# Patient Record
(demographics unavailable — no encounter records)

---

## 2024-11-15 NOTE — ASSESSMENT
[FreeTextEntry1] : This is a 59-year-old woman with exam and history consistent with essential tremor.  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  I will start her on low-dose propranolol and titrate to efficacy or to tolerance.  I did explain to her that propranolol is medicine used for blood pressure and that it may slow down her heart rate or make her lightheaded if her blood pressure drops and that is why restarting it at a low dose and will raise it slowly as needed.  Should propranolol not work we discussed primidone would be our next option.  I will see her back in 4 months but she will call me in 1 month to let me know how she is doing.

## 2024-11-15 NOTE — CONSULT LETTER
[Dear  ___] : Dear  [unfilled], [Consult Letter:] : I had the pleasure of evaluating your patient, [unfilled]. [Please see my note below.] : Please see my note below. [Consult Closing:] : Thank you very much for allowing me to participate in the care of this patient.  If you have any questions, please do not hesitate to contact me. [Sincerely,] : Sincerely, [FreeTextEntry3] : Carlos Eduardo Moran M.D., Ph.D. DPN-N [facility] [place] Director, Division of Neurology Director, [facility]

## 2024-11-15 NOTE — HISTORY OF PRESENT ILLNESS
[FreeTextEntry1] : Initial office visit November 15, 2024: This is a 59-year-old woman who presents today with tremor.  She has had it for several years.  It affects her left hand more than her right hand.  It is present with activity or holding an object.  Is not present at rest.  Her father had a similar tremor.  The tremor gets worse if she is overly excited or nervous.  She drinks significant amount of caffeine.  She is here today for neurologic  evaluation and treatment.

## 2024-11-15 NOTE — PHYSICAL EXAM
[General Appearance - Alert] : alert [General Appearance - In No Acute Distress] : in no acute distress [General Appearance - Well Nourished] : well nourished [General Appearance - Well Developed] : well developed [Person] : oriented to person [Place] : oriented to place [Time] : oriented to time [Remote Intact] : remote memory intact [Registration Intact] : recent registration memory intact [Span Intact] : the attention span was normal [Concentration Intact] : normal concentrating ability [Visual Intact] : visual attention was ~T not ~L decreased [Naming Objects] : no difficulty naming common objects [Repeating Phrases] : no difficulty repeating a phrase [Fluency] : fluency intact [Comprehension] : comprehension intact [Current Events] : adequate knowledge of current events [Past History] : adequate knowledge of personal past history [Cranial Nerves Optic (II)] : visual acuity intact bilaterally,  visual fields full to confrontation, pupils equal round and reactive to light [Cranial Nerves Oculomotor (III)] : extraocular motion intact [Cranial Nerves Trigeminal (V)] : facial sensation intact symmetrically [Cranial Nerves Facial (VII)] : face symmetrical [Cranial Nerves Vestibulocochlear (VIII)] : hearing was intact bilaterally [Cranial Nerves Glossopharyngeal (IX)] : tongue and palate midline [Cranial Nerves Accessory (XI - Cranial And Spinal)] : head turning and shoulder shrug symmetric [Cranial Nerves Hypoglossal (XII)] : there was no tongue deviation with protrusion [Motor Tone] : muscle tone was normal in all four extremities [Motor Strength] : muscle strength was normal in all four extremities [No Muscle Atrophy] : normal bulk in all four extremities [Paresis Pronator Drift Right-Sided] : no pronator drift on the right [Paresis Pronator Drift Left-Sided] : no pronator drift on the left [Motor Strength Upper Extremities Bilaterally] : strength was normal in both upper extremities [Motor Strength Lower Extremities Bilaterally] : strength was normal in both lower extremities [Sensation Tactile Decrease] : light touch was intact [Sensation Pain / Temperature Decrease] : pain and temperature was intact [Sensation Vibration Decrease] : vibration was intact [Proprioception] : proprioception was intact [Abnormal Walk] : normal gait [Balance] : balance was intact [Tremor] : a tremor present [Coordination - Dysmetria Impaired Finger-to-Nose Bilateral] : not present [2+] : Patella left 2+ [Sclera] : the sclera and conjunctiva were normal [PERRL With Normal Accommodation] : pupils were equal in size, round, reactive to light, with normal accommodation [Extraocular Movements] : extraocular movements were intact [No APD] : no afferent pupillary defect [No MICHELLE] : no internuclear ophthalmoplegia [Full Visual Field] : full visual field

## 2024-12-11 NOTE — END OF VISIT
[FreeTextEntry3] : I, Nicole Enriquez, acted solely as a scribe for Dr. Ba Khan on this date 12/11/2024.  I, Ba Khan MD, personally performed the services described in the documentation, reviewed the documentation recorded by the scribe in my presence and it accurately and completely records my words and actions.

## 2024-12-11 NOTE — DISCUSSION/SUMMARY
[Medication Risks Reviewed] : Medication risks reviewed [de-identified] : 59-year-old female with bilateral knee moderate medial patellofemoral compartmental osteoarthritis as well as a previously confirmed degenerative meniscus tear of the right knee via MRI back in 2016. The nature of condition and treatment options were discussed in detail. Pt is not yet a candidate for b/l TKA.  She did previously respond well to a cortisone injection given to the right knee back in 2015. However, she did not respond to the last cortisone injection. We discussed to proceed with viscosupplementation and physical therapy. I ordered the gel injections. I sent a script for physical therapy. Pt may continue to take Meloxicam and Tylenol as needed. She should continue to do low impact exercises. She may take Tylenol and NSAIDs as needed. Pt will f/u when gel arrives at office.

## 2024-12-11 NOTE — PHYSICAL EXAM
[de-identified] : GENERAL APPEARANCE: Well nourished and hydrated, pleasant, alert, and oriented x 3. Appears their stated age.  HEENT: Normocephalic, extraocular eye motion intact. Nasal septum midline. Oral cavity clear. External auditory canal clear.  RESPIRATORY: Breath sounds clear and audible in all lobes. No wheezing, No accessory muscle use. CARDIOVASCULAR: No apparent abnormalities. No lower leg edema. No varicosities. Pedal pulses are palpable. NEUROLOGIC: Sensation is normal, no muscle weakness in the upper or lower extremities. DERMATOLOGIC: No apparent skin lesions, moist, warm, no rash. SPINE: Cervical spine appears normal and moves freely; thoracic spine appears normal and moves freely; lumbosacral spine appears normal and moves freely, normal, nontender. MUSCULOSKELETAL: Hands, wrists, and elbows are normal and move freely, shoulders are normal and move freely.  PSYCHIATRIC: Oriented to person, place, and time, insight and judgement were intact and the affect was normal.  Right knee exam shows mild effusion, ROM is 0-120 degrees with pain at terminal flexion and positive patellofemoral crepitus, no gross instability, negative Frederic, medial joint line tenderness.  Left knee exam shows mild effusion, ROM is 0-120 degrees and positive patellofemoral crepitus, no gross instability, negative Frederic, medial joint line tenderness. 5/5 motor strength in bilateral lower extremities. Sensory: Intact in bilateral lower extremities. DTRs: Biceps, brachioradialis, triceps, patellar, ankle and plantar 2+ and symmetric bilaterally. Pulses: dorsalis pedis, posterior tibial, femoral, popliteal, and radial 2+ and symmetric bilaterally.

## 2024-12-11 NOTE — HISTORY OF PRESENT ILLNESS
[de-identified] : Patient is a 59-year-old female that does present today for follow up of bilateral knee pain.  She was first seen in 2015 had cortisone injections which she did well with. She was last seen in September had bilateral cortisone injections.  She reports she did not get any relief from these injections, and they only lasted a few days.  She reports pain since having the injections has been worse.  No recent injury. She has been taking Meloxicam that helps.  She presents today with a chief complaint of intermittent, moderate on the right, mild to moderate on the left, dull achy pain over the anterior medial aspect of both knees. Exacerbated with walking or standing.  Associate mild stiffness and painful crepitus in both knees.  No catching, locking or buckling.  No radicular pain or paresthesia.  BMI of 33.  She is a prediabetic.  No history of sleep apnea or use of blood thinners.  She is a non-smoker.

## 2025-01-31 NOTE — HISTORY OF PRESENT ILLNESS
[Cystocele (Obstetric)] : no [Uterine Prolapse] : no [Vaginal Wall Prolapse] : no [Rectal Prolapse] : no [Stress Incontinence] : no [Urge Incontinence Of Urine] : no [Unable To Restrain Bowel Movement] : no [Urinary Frequency] : no [x2] : nocturia two times a night [Incomplete Emptying Of Bladder] : no [Feelings Of Urinary Urgency] : no [Pain During Urination (Dysuria)] : no [Urinary Tract Infection] : moderate [Incomplete Emptying Of Stool] : no [] : no [Stool Visible Blood] : no [Pelvic Pain] : no [Vaginal Pain] : no [Vulvar Pain] : no [FreeTextEntry1] : STEFANIE is a 59 year female who presents for f/u s/p RA SCP KEENAN sling cysto on 07/15/2024. Last seen in office on 08/26/2024 for post-op visit.   Denies dysuria, BALTA, incomplete emptying. Denies vaginal bleeding, bulge, pain.

## 2025-01-31 NOTE — DISCUSSION/SUMMARY
[FreeTextEntry1] : STEFANIE is a 59 year female who presents for f/u s/p RA SCP KEENAN sling cysto on 07/15/2024.  Happy with surgery results. No current concerns.   [] []   f/u All questions answered.

## 2025-01-31 NOTE — ADDENDUM
[FreeTextEntry1] : This note was written by Surekha Barroso, acting as the  for Dr. Wong. This note accurately reflects the work and decisions made by Dr. Wong.

## 2025-02-04 NOTE — DISCUSSION/SUMMARY
[FreeTextEntry1] : 59-year-old -0-1-3 presents for follow-up care.  Recently had cystocele surgery by urogynecology and cystocele has been repaired.  She feels good no vaginal pain or pressure.  She is to return in 3 months for reevaluation and perhaps Pap and cultures.  Retrieve mammogram.

## 2025-02-04 NOTE — HISTORY OF PRESENT ILLNESS
[FreeTextEntry1] : 59-year-old -0-1-3 status post hysterectomy and developed cystocele which was repaired by urogynecology.  She now denies any vaginal pressure or pain.

## 2025-02-04 NOTE — COUNSELING
[Nutrition/ Exercise/ Weight Management] : nutrition, exercise, weight management [Body Image] : body image [Vitamins/Supplements] : vitamins/supplements [Sunscreen] : sunscreen [Drugs] : drugs [Bladder Hygiene] : bladder hygiene [Breast Self Exam] : breast self exam [Confidentiality] : confidentiality